# Patient Record
Sex: MALE | Race: OTHER | HISPANIC OR LATINO | URBAN - METROPOLITAN AREA
[De-identification: names, ages, dates, MRNs, and addresses within clinical notes are randomized per-mention and may not be internally consistent; named-entity substitution may affect disease eponyms.]

---

## 2019-05-13 ENCOUNTER — EMERGENCY (EMERGENCY)
Facility: HOSPITAL | Age: 74
LOS: 0 days | Discharge: HOME | End: 2019-05-13
Attending: EMERGENCY MEDICINE | Admitting: EMERGENCY MEDICINE
Payer: MEDICARE

## 2019-05-13 VITALS
TEMPERATURE: 96 F | DIASTOLIC BLOOD PRESSURE: 80 MMHG | HEIGHT: 64 IN | WEIGHT: 160.06 LBS | OXYGEN SATURATION: 96 % | SYSTOLIC BLOOD PRESSURE: 130 MMHG | RESPIRATION RATE: 13 BRPM | HEART RATE: 82 BPM

## 2019-05-13 DIAGNOSIS — L50.0 ALLERGIC URTICARIA: ICD-10-CM

## 2019-05-13 PROCEDURE — 99283 EMERGENCY DEPT VISIT LOW MDM: CPT

## 2019-05-13 RX ORDER — SODIUM CHLORIDE 9 MG/ML
1000 INJECTION INTRAMUSCULAR; INTRAVENOUS; SUBCUTANEOUS ONCE
Refills: 0 | Status: DISCONTINUED | OUTPATIENT
Start: 2019-05-13 | End: 2019-05-13

## 2019-05-13 NOTE — ED PROVIDER NOTE - PHYSICAL EXAMINATION
Physical Exam    Vital Signs: I have reviewed the initial vital signs.  Constitutional: well-nourished, appears stated age, no acute distress  Eyes: Conjunctiva pink, Sclera clear, PERRLA, EOMI.  Cardiovascular: S1 and S2, regular rate, regular rhythm, well-perfused extremities, radial pulses equal and 2+  Respiratory: unlabored respiratory effort, clear to auscultation bilaterally no wheezing, rales and rhonchi  Gastrointestinal: soft, non-tender abdomen, no pulsatile mass, normal bowl sounds  Musculoskeletal: supple neck, no lower extremity edema, no midline tenderness  Integumentary: warm, dry,+ hives to body and mild swelling to lower lip. no posterior swelling to oropharnyx   Neurologic: awake, alert, cranial nerves II-XII grossly intact, extremities’ motor and sensory functions grossly intact  Psychiatric: appropriate mood, appropriate affect

## 2019-05-13 NOTE — ED PROVIDER NOTE - NSFOLLOWUPINSTRUCTIONS_ED_ALL_ED_FT
Follow up with your primary care doctor in 1-2 days    General Allergic Reaction    WHAT YOU NEED TO KNOW:    An allergic reaction is your body's response to an allergen. Allergens include medicines, food, insect stings, animal dander, mold, latex, chemicals, and dust mites. Pollen from trees, grass, and weeds can also cause an allergic reaction. An allergic reaction can range from mild to severe.    DISCHARGE INSTRUCTIONS:    Call 911 for signs or symptoms of anaphylaxis, such as trouble breathing, swelling in your mouth or throat, or wheezing. You may also have itching, a rash, hives, or feel like you are going to faint.    Return to the emergency department if:     You have a skin rash, hives, swelling, or itching that is starting to get worse.      Your throat tightens, or your lips or tongue swell.      You have trouble swallowing or speaking.      You have worsening nausea, diarrhea, or abdominal cramps, or you are vomiting.      You have chest pain or tightness.    Contact your healthcare provider if:     You have questions or concerns about your condition or care.        Medicines: You may need any of the following:     Medicines may be given to relieve certain allergy symptoms such as itching, sneezing, and swelling. You may take them as a pill or use drops in your nose or eyes. Topical treatments may be given to put directly on your skin to help decrease itching or swelling.      Epinephrine may be prescribed if you are at risk for anaphylaxis. This is a severe allergic reaction that can be life-threatening. Your healthcare provider will tell you if you need to keep epinephrine with you. You will be taught when and how to use it.      Take your medicine as directed. Contact your healthcare provider if you think your medicine is not helping or if you have side effects. Tell him of her if you are allergic to any medicine. Keep a list of the medicines, vitamins, and herbs you take. Include the amounts, and when and why you take them. Bring the list or the pill bottles to follow-up visits. Carry your medicine list with you in case of an emergency.    Follow up with your healthcare provider as directed: Write down your questions so you remember to ask them during your visits.     Manage your symptoms:     Avoid allergens. You may need to have allergy testing with your healthcare provider or a specialist to find your allergens.      Use cold compresses on your skin or eyes. This will help soothe skin or eyes affected by the allergic reaction. You can make a cold compress by soaking a washcloth in cool water. Wring out the extra water before you apply the washcloth.      Rinse your nasal passages with a saline solution. Daily rinsing may help clear allergens out of your nose. Use distilled water if possible. You can also boil tap water and then let it cool before you use it. Do not use tap water without boiling it first.      Do not smoke. Nicotine and other chemicals in cigarettes and cigars can make an allergic reaction worse, and can also cause lung damage. Ask your healthcare provider for information if you currently smoke and need help to quit. E-cigarettes or smokeless tobacco still contain nicotine. Talk to your healthcare provider before you use these products.          © Copyright [employer] 2019 All illustrations and images included in CareNotes are the copyrighted property of [employer] or [employer].

## 2019-05-13 NOTE — ED PROVIDER NOTE - NS ED ROS FT
Constitutional: (-) fever  Eyes/ENT: (-) blurry vision, (-) epistaxis  Cardiovascular: (-) chest pain, (-) syncope  Respiratory: (-) cough, (-) shortness of breath  Gastrointestinal: (-) vomiting, (-) diarrhea  Musculoskeletal: (-) neck pain, (-) back pain, (-) joint pain  Integumentary: (+ rash, (+) edema  Neurological: (-) headache, (-) altered mental status  Psychiatric: (-) hallucinations  Allergic/Immunologic: (+) pruritus

## 2019-05-13 NOTE — ED PROVIDER NOTE - CARE PROVIDER_API CALL
Aylin Schmidt)  Allergy and Immunology; Internal Medicine  [address] [place] [address]  Phone: [phone]  Fax: [phone]  Follow Up Time: 1-3 Days

## 2019-05-13 NOTE — ED PROVIDER NOTE - CLINICAL SUMMARY MEDICAL DECISION MAKING FREE TEXT BOX
Lip swelling improved, still with hives although itching has resolved,  Pt to continue Benadryl around the clock for next 48 hrs and then as needed, will cont steroids.  Pt to follow up with PMD and allergist, Pt instructed to return if any worsening symptoms or concerns.  They verbalize understanding.

## 2019-05-13 NOTE — ED PROVIDER NOTE - OBJECTIVE STATEMENT
73 year old male states that he was took pepcid today for the first time and after was cleaning up and it was [place] and started to have lip swelling and red itchy rash. patient states he took 50mg of benadryl 30min PTA and states everything is getting better.

## 2019-05-13 NOTE — ED PROVIDER NOTE - ATTENDING CONTRIBUTION TO CARE
72 yo M presents with c/o hives, lip swelling tonight.  Pt took Benadryl 50 mg PTA.  + itching.  Pt was working around dust and took Pepcid for the first time so he is unsure what he could be allergic to.  no SOB, no n/v/d.  On exam pt in NAD AAO x 3, speaks full clear sentences, OP clear, no uvula or tongue swelling, + mild swelling lower lip, Lungs CAT B/L, + scattered hives to neck and arms, trunk

## 2019-06-09 ENCOUNTER — EMERGENCY (EMERGENCY)
Facility: HOSPITAL | Age: 74
LOS: 0 days | Discharge: HOME | End: 2019-06-10
Attending: EMERGENCY MEDICINE | Admitting: EMERGENCY MEDICINE
Payer: MEDICARE

## 2019-06-09 VITALS
RESPIRATION RATE: 18 BRPM | TEMPERATURE: 98 F | OXYGEN SATURATION: 100 % | SYSTOLIC BLOOD PRESSURE: 141 MMHG | HEART RATE: 61 BPM | DIASTOLIC BLOOD PRESSURE: 73 MMHG

## 2019-06-09 VITALS
SYSTOLIC BLOOD PRESSURE: 81 MMHG | DIASTOLIC BLOOD PRESSURE: 66 MMHG | RESPIRATION RATE: 20 BRPM | TEMPERATURE: 97 F | OXYGEN SATURATION: 91 % | HEART RATE: 130 BPM
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
## 2019-06-09 DIAGNOSIS — Z79.899 OTHER LONG TERM (CURRENT) DRUG THERAPY: ICD-10-CM
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
## 2019-06-09 DIAGNOSIS — T78.40XA ALLERGY, UNSPECIFIED, INITIAL ENCOUNTER: ICD-10-CM

## 2019-06-09 DIAGNOSIS — Z79.52 LONG TERM (CURRENT) USE OF SYSTEMIC STEROIDS: ICD-10-CM
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
## 2019-06-09 LAB
ALBUMIN SERPL ELPH-MCNC: 4.2 G/DL — SIGNIFICANT CHANGE UP (ref 3.5–5.2)
ALP SERPL-CCNC: 107 U/L — SIGNIFICANT CHANGE UP (ref 30–115)
ALT FLD-CCNC: 19 U/L — SIGNIFICANT CHANGE UP (ref 0–41)
ANION GAP SERPL CALC-SCNC: 11 MMOL/L — SIGNIFICANT CHANGE UP (ref 7–14)
ANISOCYTOSIS BLD QL: SLIGHT — SIGNIFICANT CHANGE UP
AST SERPL-CCNC: 25 U/L — SIGNIFICANT CHANGE UP (ref 0–41)
BASOPHILS # BLD AUTO: 0.04 K/UL — SIGNIFICANT CHANGE UP (ref 0–0.2)
BASOPHILS NFR BLD AUTO: 0.4 % — SIGNIFICANT CHANGE UP (ref 0–1)
BILIRUB SERPL-MCNC: 0.5 MG/DL — SIGNIFICANT CHANGE UP (ref 0.2–1.2)
BUN SERPL-MCNC: 15 MG/DL — SIGNIFICANT CHANGE UP (ref 10–20)
CALCIUM SERPL-MCNC: 9.1 MG/DL — SIGNIFICANT CHANGE UP (ref 8.5–10.1)
CHLORIDE SERPL-SCNC: 99 MMOL/L — SIGNIFICANT CHANGE UP (ref 98–110)
CO2 SERPL-SCNC: 27 MMOL/L — SIGNIFICANT CHANGE UP (ref 17–32)
CREAT SERPL-MCNC: 0.9 MG/DL — SIGNIFICANT CHANGE UP (ref 0.7–1.5)
EOSINOPHIL # BLD AUTO: 0.11 K/UL — SIGNIFICANT CHANGE UP (ref 0–0.7)
EOSINOPHIL NFR BLD AUTO: 1.2 % — SIGNIFICANT CHANGE UP (ref 0–8)
GLUCOSE SERPL-MCNC: 110 MG/DL — HIGH (ref 70–99)
HCT VFR BLD CALC: 54 % — HIGH (ref 42–52)
HGB BLD-MCNC: 18.1 G/DL — HIGH (ref 14–18)
IMM GRANULOCYTES NFR BLD AUTO: 0.3 % — SIGNIFICANT CHANGE UP (ref 0.1–0.3)
LYMPHOCYTES # BLD AUTO: 5.88 K/UL — HIGH (ref 1.2–3.4)
LYMPHOCYTES # BLD AUTO: 63.4 % — HIGH (ref 20.5–51.1)
MCHC RBC-ENTMCNC: 29.1 PG — SIGNIFICANT CHANGE UP (ref 27–31)
MCHC RBC-ENTMCNC: 33.5 G/DL — SIGNIFICANT CHANGE UP (ref 32–37)
MCV RBC AUTO: 87 FL — SIGNIFICANT CHANGE UP (ref 80–94)
MICROCYTES BLD QL: SLIGHT — SIGNIFICANT CHANGE UP
MONOCYTES # BLD AUTO: 0.67 K/UL — HIGH (ref 0.1–0.6)
MONOCYTES NFR BLD AUTO: 7.2 % — SIGNIFICANT CHANGE UP (ref 1.7–9.3)
NEUTROPHILS # BLD AUTO: 2.54 K/UL — SIGNIFICANT CHANGE UP (ref 1.4–6.5)
NEUTROPHILS NFR BLD AUTO: 27.5 % — LOW (ref 42.2–75.2)
NEUTS BAND # BLD: 1 % — SIGNIFICANT CHANGE UP (ref 0–6)
NRBC # BLD: 0 /100 WBCS — SIGNIFICANT CHANGE UP (ref 0–0)
NRBC # BLD: 0 /100 — SIGNIFICANT CHANGE UP (ref 0–0)
PLAT MORPH BLD: NORMAL — SIGNIFICANT CHANGE UP
PLATELET # BLD AUTO: 409 K/UL — HIGH (ref 130–400)
POTASSIUM SERPL-MCNC: 4.4 MMOL/L — SIGNIFICANT CHANGE UP (ref 3.5–5)
POTASSIUM SERPL-SCNC: 4.4 MMOL/L — SIGNIFICANT CHANGE UP (ref 3.5–5)
PROT SERPL-MCNC: 6.5 G/DL — SIGNIFICANT CHANGE UP (ref 6–8)
RBC # BLD: 6.21 M/UL — HIGH (ref 4.7–6.1)
RBC # FLD: 12.9 % — SIGNIFICANT CHANGE UP (ref 11.5–14.5)
RBC BLD AUTO: ABNORMAL
SODIUM SERPL-SCNC: 137 MMOL/L — SIGNIFICANT CHANGE UP (ref 135–146)
VARIANT LYMPHS # BLD: 14 % — HIGH (ref 0–5)
WBC # BLD: 9.27 K/UL — SIGNIFICANT CHANGE UP (ref 4.8–10.8)
WBC # FLD AUTO: 9.27 K/UL — SIGNIFICANT CHANGE UP (ref 4.8–10.8)

## 2019-06-09 PROCEDURE — 99291 CRITICAL CARE FIRST HOUR: CPT

## 2019-06-09 RX ORDER — SODIUM CHLORIDE 9 MG/ML
1000 INJECTION INTRAMUSCULAR; INTRAVENOUS; SUBCUTANEOUS ONCE
Refills: 0 | Status: COMPLETED | OUTPATIENT
Start: 2019-06-09 | End: 2019-06-09

## 2019-06-09 RX ORDER — DIPHENHYDRAMINE HCL 50 MG
25 CAPSULE ORAL ONCE
Refills: 0 | Status: COMPLETED | OUTPATIENT
Start: 2019-06-09 | End: 2019-06-09

## 2019-06-09 RX ORDER — FAMOTIDINE 10 MG/ML
20 INJECTION INTRAVENOUS ONCE
Refills: 0 | Status: COMPLETED | OUTPATIENT
Start: 2019-06-09 | End: 2019-06-09

## 2019-06-09 RX ADMIN — Medication 25 MILLIGRAM(S): at 21:10

## 2019-06-09 RX ADMIN — SODIUM CHLORIDE 1000 MILLILITER(S): 9 INJECTION INTRAMUSCULAR; INTRAVENOUS; SUBCUTANEOUS at 21:09

## 2019-06-09 RX ADMIN — Medication 125 MILLIGRAM(S): at 21:10

## 2019-06-09 RX ADMIN — FAMOTIDINE 100 MILLIGRAM(S): 10 INJECTION INTRAVENOUS at 21:10

## 2019-06-09 NOTE — ED ADULT NURSE NOTE - NSIMPLEMENTINTERV_GEN_ALL_ED
Implemented All Universal Safety Interventions:  [place] to call system. Call bell, personal items and telephone within reach. Instruct patient to call for assistance. Room bathroom lighting operational. Non-slip footwear when patient is off stretcher. Physically safe environment: no spills, clutter or unnecessary equipment. Stretcher in lowest position, wheels locked, appropriate side rails in place.

## 2019-06-09 NOTE — ED ADULT TRIAGE NOTE - CHIEF COMPLAINT QUOTE
pt states he took ibuprofen 30 minutes ago and developed redness, rash, throat itchy, trouble breathing

## 2019-06-10 PROBLEM — Z78.9 OTHER SPECIFIED HEALTH STATUS: Chronic | Status: ACTIVE | Noted: 2019-05-13

## 2019-06-10 LAB
MANUAL DIF COMMENT BLD-IMP: SIGNIFICANT CHANGE UP

## 2019-06-10 RX ORDER — EPINEPHRINE 0.3 MG/.3ML
0.3 INJECTION INTRAMUSCULAR; SUBCUTANEOUS
Qty: 0.3 | Refills: 0
Start: 2019-06-10 | End: 2019-06-10

## 2019-06-10 NOTE — ED PROVIDER NOTE - CARE PROVIDER_API CALL
Aylin Schmidt)  Allergy and Immunology; Internal Medicine  [address] [place] [address]  Phone: [phone]  Fax: [phone]  Follow Up Time: 1-3 Days

## 2019-06-10 NOTE — ED PROVIDER NOTE - ENMT NEGATIVE STATEMENT, MLM
Ears: no ear pain and no hearing problems.Nose: no nasal congestion and no nasal drainage.Mouth/Throat: no dysphagia, no hoarseness and no throat pain.Neck: no lumps, no pain, no stiffness and no swollen glands. Patient has lip swelling and facial swelling

## 2019-06-10 NOTE — ED PROVIDER NOTE - CRITICAL CARE PROVIDED
interpretation of diagnostic studies/additional history taking/documentation/consult w/ pt's family directly relating to pts condition/direct patient care (not related to procedure)

## 2019-06-10 NOTE — ED PROVIDER NOTE - OBJECTIVE STATEMENT
Patient is a 72 yo m who presents for evaluation of allergic reaction he has a history in the past and took motrin and developed rash on his trunk, lip swelling and facial swelling onset within the past 2 hours, he took po benadryl but did not improve, he denies any chest pain he denies any shortness of breath or palpitations he denies any fevers or chills. He is able to speak in full sentences

## 2019-06-10 NOTE — ED PROVIDER NOTE - ATTENDING CONTRIBUTION TO CARE
I was present for and supervised the key and critical aspects of the procedures performed during the care of the patient. Patient is a 74 yo m who presents for evaluation of allergic reaction he has a history in the past and took motrin and developed rash on his trunk, lip swelling and facial swelling onset within the past 2 hours, he took po benadryl but did not improve, he denies any chest pain he denies any shortness of breath or palpitations he denies any fevers or chills. He is able to speak in full sentences  On physical exam the patient is nc/at perrla eomi oropharynx clear he has lip swelling, with right sided facial swelling, no tongue swelling, no drooling.  cta b/l, rrr s1s2 noted abd-soft ext from   patient has urticarial rash noted on the stomach   A/P- iv access established, given solumedrol as well as benadryl, patient re-evaluated after 20 min after administration He has improved.  I will continue to monitor at this time

## 2019-06-10 NOTE — ED PROVIDER NOTE - ENMT, MLM
Airway patent, Nasal mucosa clear. Mouth with normal mucosa. Throat has no vesicles, no oropharyngeal exudates and uvula is midline. patient has upper and lower lip swelling.  with right sided max facial swelling noted